# Patient Record
Sex: FEMALE | Race: OTHER
[De-identification: names, ages, dates, MRNs, and addresses within clinical notes are randomized per-mention and may not be internally consistent; named-entity substitution may affect disease eponyms.]

---

## 2022-06-13 ENCOUNTER — HOSPITAL ENCOUNTER (EMERGENCY)
Dept: HOSPITAL 54 - ER | Age: 67
Discharge: HOME | End: 2022-06-13
Payer: MEDICAID

## 2022-06-13 VITALS — WEIGHT: 135 LBS | BODY MASS INDEX: 23.05 KG/M2 | HEIGHT: 64 IN

## 2022-06-13 VITALS — DIASTOLIC BLOOD PRESSURE: 64 MMHG | SYSTOLIC BLOOD PRESSURE: 127 MMHG

## 2022-06-13 DIAGNOSIS — Z79.899: ICD-10-CM

## 2022-06-13 DIAGNOSIS — I10: ICD-10-CM

## 2022-06-13 DIAGNOSIS — F41.0: Primary | ICD-10-CM

## 2022-06-13 NOTE — NUR
TO ER BED 9. [id number] FROM HOME FOR "WOKE UP SHAKING AND FEELING COLD W/ PANIC 
ATTACK". PT STATES "I FEEL MUCH CALMER UPON ARRIVAL TO HOSPITAL". DENIES ANY 
CHEST PAIN OR SOB. CONNECTED TO MONITOR. AWAITING MD REBOLLAR.